# Patient Record
Sex: FEMALE | Race: WHITE | NOT HISPANIC OR LATINO | Employment: FULL TIME | URBAN - METROPOLITAN AREA
[De-identification: names, ages, dates, MRNs, and addresses within clinical notes are randomized per-mention and may not be internally consistent; named-entity substitution may affect disease eponyms.]

---
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
## 2022-02-11 VITALS
HEIGHT: 60 IN | TEMPERATURE: 99.3 F | OXYGEN SATURATION: 99 % | SYSTOLIC BLOOD PRESSURE: 108 MMHG | BODY MASS INDEX: 20.81 KG/M2 | WEIGHT: 106 LBS | WEIGHT: 112 LBS | HEIGHT: 60 IN | DIASTOLIC BLOOD PRESSURE: 70 MMHG | RESPIRATION RATE: 16 BRPM | BODY MASS INDEX: 21.99 KG/M2 | TEMPERATURE: 98 F | HEART RATE: 84 BPM

## 2022-02-11 VITALS
HEIGHT: 60 IN | DIASTOLIC BLOOD PRESSURE: 66 MMHG | SYSTOLIC BLOOD PRESSURE: 104 MMHG | BODY MASS INDEX: 21.17 KG/M2 | HEART RATE: 68 BPM | WEIGHT: 107.8 LBS

## 2022-02-11 VITALS
HEIGHT: 60 IN | SYSTOLIC BLOOD PRESSURE: 110 MMHG | BODY MASS INDEX: 21.52 KG/M2 | WEIGHT: 109.6 LBS | HEART RATE: 78 BPM | HEART RATE: 80 BPM | BODY MASS INDEX: 21.4 KG/M2 | HEIGHT: 60 IN | DIASTOLIC BLOOD PRESSURE: 60 MMHG | DIASTOLIC BLOOD PRESSURE: 62 MMHG | SYSTOLIC BLOOD PRESSURE: 102 MMHG | TEMPERATURE: 98.2 F

## 2022-02-16 NOTE — PROGRESS NOTES
Patient:   LUKE COSTELLO            MRN: [id number]            FIN: [id number]               Age:   16 years     Sex:  Female     :  2000   Associated Diagnoses:   Depression, Major; Migraine; Asthma   Author:   Ross GREEN, Chato      Impression and Plan   Diagnosis     Depression, Major (WJS42-SK F32.2).     Course:  Well controlled.    Orders     Orders   Charges (Evaluation and Management):  [id number] office outpatient visit 15 minutes (Charge) (Order): Quantity: 1, Depression, Major  Migraine.     Orders (Selected)   Prescriptions  Prescribed  Lexapro 5 mg oral tablet: 1 tab(s) ( 5 mg ), po, daily, # 30 tab(s), 5 Refill(s), Type: Maintenance, Pharmacy: [facility], 1 tab(s) po daily.     Diagnosis     Migraine (CYN00-XB G43.709).     Course:  Progressing as expected.    Orders     Orders (Selected)   Prescriptions  Prescribed  SUMAtriptan 100 mg oral tablet: 1 tab(s) ( 100 mg ), PO, Once, PRN: for migraine headache, # 9 tab(s), 5 Refill(s), Type: Soft Stop, Pharmacy: [facility], 1 tab(s) po once,PRN:for migraine headache.     Diagnosis     Asthma (BHH58-FN J45.909).     Course:  Progressing as expected.    Orders     Orders (Selected)   Prescriptions  Prescribed  ProAir HFA 90 mcg/inh inhalation aerosol: 2 puff(s) ( 180 mcg ), inh, qid, Instructions: or as directed 15 minutes before exercise, # 2 EA, 3 Refill(s), Type: Maintenance, Pharmacy: [facility], 2 puff(s) inh qid,Instr:or as directed 15 minutes before exercise  albuterol 2.5 mg/3 mL (0.083%) inhalation solution: 3 mL ( 2.5 mg ), INH, q4 hrs, PRN: for wheezing, # 60 EA, 2 Refill(s), Type: Maintenance, Pharmacy: [facility], 3 mL inh q4 hrs,PRN:for wheezing.        Visit Information      Date of Service: 2017 03:37 pm  Performing Location: [facility]  Encounter#: [id number]      Primary Care Provider (PCP):  Shannon Reyes# [id number]   Visit type:  Scheduled follow-up.    Accompanied  by:  Family member.    Source of history:  Self, Family member.    Referral source:  Self.    History limitation:  None.       Chief Complaint   1/17/2017 3:39 PM CST    Pt here for med ck        Interval History   Since the patient's last visit for a migraine:   The patient reports the migraine episodes remain unchanged.  The patient has experienced a decrease in activity level, ability to perform household functions, sleep and ability to perform work/ school duties.  Associated symptom(s) consist of sensitivity to light and nausea.  Depression   Side effects of medication unchanged.  The course is progressing as expected.  Compliance problems: none.  The effect on daily activities is no change in activity level, no change in eating habits and no change in sleeping patterns.  Medical encounters: none.  Associated symptoms characterized by no insomnia, weight gain, weight loss, loss of appetite or suicidal thoughts.        Review of Systems   Constitutional:  Negative.    Eye:  Negative.    Ear/Nose/Mouth/Throat:  Negative.    Respiratory:  Negative.    Cardiovascular:  Negative.    Gastrointestinal:  Negative.    Genitourinary:  Negative.    Hematology/Lymphatics:  Negative.    Endocrine:  Negative.    Immunologic:  Negative.    Musculoskeletal:  Negative.    Integumentary:  Negative.    Neurologic:  Negative.    Psychiatric:  Negative.    All other systems reviewed and negative      Health Status   Allergies:    Allergic Reactions (Selected)  Severity Not Documented  Flagyl (Rash)   Medications:  (Selected)   Prescriptions  Prescribed  Lexapro 5 mg oral tablet: 1 tab(s) ( 5 mg ), po, daily, # 30 tab(s), 5 Refill(s), Type: Maintenance, Pharmacy: [facility], 1 tab(s) po daily  ProAir HFA 90 mcg/inh inhalation aerosol: 2 puff(s) ( 180 mcg ), inh, qid, Instructions: or as directed 15 minutes before exercise, # 2 EA, 3 Refill(s), Type: Maintenance, Pharmacy: [facility], 2 puff(s) inh qid,Instr:or as  directed 15 minutes before exercise  SUMAtriptan 100 mg oral tablet: 1 tab(s) ( 100 mg ), PO, Once, PRN: for migraine headache, # 9 tab(s), 5 Refill(s), Type: Soft Stop, Pharmacy: [facility], 1 tab(s) po once,PRN:for migraine headache  albuterol 2.5 mg/3 mL (0.083%) inhalation solution: 3 mL ( 2.5 mg ), INH, q4 hrs, PRN: for wheezing, # 60 EA, 2 Refill(s), Type: Maintenance, Pharmacy: [facility], 3 mL inh q4 hrs,PRN:for wheezing  Documented Medications  Documented  Su 13.5 mg intrauterine device: 1 EA ( 13.5 mg ), intrauteral, once, Instructions: placed 11/17/16 due for removal by 11/17/19, 0 Refill(s), Type: Maintenance   Problem list:    All Problems (Selected)  Anxiety, generalized / SNOMED CT 09374081 / Confirmed  Asthma / SNOMED CT 449892159 / Confirmed  Depression, Major / SNOMED CT 711264203 / Confirmed  Migraine / SNOMED CT 43221860 / Confirmed      Histories   Past Medical History:    Resolved  *Hospitalized@[facility] - Gastric ulcer, mild appendicitis: Onset on 3/7/2016 at 15 years.  Resolved on 3/9/2016 at 15 years.  GERD without Esophagitis (530.81): Onset on 9/21/2012 at 11 years.  Resolved.  Asthma, exercise induced (493.81):  Resolved.   Family History:    Hypertension  Grandmother (M)  Arthritis  Mother (Lana Abrams)  Grandmother (M)  Grandmother (P)  Grandfather (M)  Grandfather (P)  Great Grandfather (M)  Great Grandmother (M)  Great Grandfather (P)  Great Grandmother (P)  Migraine  Mother (Lana Jose Alberto)  Grandmother (M)  Grandmother (P)  Brother (Feliciano Sykes)  Cancer of colon  Great Grandmother (M)  Depression  Grandmother (M)  Brother (Feliciano Sykes)  Sleep apnea  Father (Colin Sykes)  MI - Myocardial infarction  Great Grandfather (M)  Environmental allergy  Mother (Lana Jose Alberto)  Grandmother (M)     Procedure history:    Insertion of IUD (SNOMED CT 547843893) performed by Shannon Reyes on 11/17/2016 at 16 Years.  Comments:  11/22/2016 8:25 AM - Henny Hirsch CMA  insertion  consent form signed with [facility]    Lot: [id number]  Exp: 5/2017    due for removal on or by 11/17/19  Appendectomy (SNOMED CT 434201186) in the month of 3/2016 at 15 Years.  Comments:  3/15/2016 3:39 PM - Ross GREEN, [facility]  Esophagogastroduodenoscopy (SNOMED CT 932219081) in the month of 3/2016 at 15 Years.  Tonsillectomy and adenoidectomy (SNOMED CT 478871594).  Tympanostomy (SNOMED CT 5784254160).   Social History:        Tobacco Assessment: Denies Tobacco Use      Home and Environment Assessment            Lives with Mother, Stepfather.  Smoker in household: Yes.        Physical Examination   Vital Signs   1/17/2017 3:39 PM CST Peripheral Pulse Rate 68 bpm    Pulse Site Radial artery    HR Method Manual    Systolic Blood Pressure 104 mmHg    Diastolic Blood Pressure 66 mmHg    Mean Arterial Pressure 79 mmHg    BP Site Right arm    BP Method Manual      Measurements from flowsheet : Measurements   1/17/2017 3:39 PM CST Height Measured - Standard 60 in    Weight Measured - Standard 107.8 lb    BSA 1.44 m2    Body Mass Index 21.05 kg/m2    Body Mass Index Percentile 46.40      General:  No acute distress.    Neck:  Supple, No lymphadenopathy, No thyromegaly.    Respiratory:  Lungs are clear to auscultation, Respirations are non-labored, Breath sounds are equal, Symmetrical chest wall expansion.    Cardiovascular:  Normal rate, Regular rhythm, No murmur, No gallop, Good pulses equal in all extremities, Normal peripheral perfusion, No edema.    Gastrointestinal:  Soft, Non-tender, Non-distended, Normal bowel sounds, No organomegaly.    Integumentary:  Warm, Dry, Pink.    Neurologic:  Alert, Oriented.    Psychiatric:  Cooperative, Appropriate mood & affect.

## 2022-02-16 NOTE — PROGRESS NOTES
Patient:   RAMONA COSTELLO            MRN: [id number]            FIN: [id number]               Age:   16 years     Sex:  Female     :  2000   Associated Diagnoses:   Anxiety, generalized   Author:   Shannon Reyes      Visit Information      Date of Service: 2017 08:34 am  Performing Location: [facility]  Encounter#: [id number]      Primary Care Provider (PCP):  Shannon Reyes    NPI# [id number]      Referring Provider:  Shannon Reyes NPI# [id number]      Chief Complaint   2017 10:19 AM CDT    Pt. here for anxiety meds        History of Present Illness   Confirmed symptoms and concerns with patient as presented in CC above.  Here wtih mom, states she needs refill of Lexapro but needs it at the 10mg dose. Was seen in  in [place] and saw counselor to learn coping mechanisms and very effective, dose was increased to 10mg lexapro then.  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  STates she feels anxiety symptoms only (See JUAN score today) but denies any depression , that has resolved      Review of Systems            Health Status   Allergies:    Allergic Reactions (Selected)  Severity Not Documented  Flagyl (Rash)   Medications:  (Selected)   Prescriptions  Prescribed  Lexapro 10 mg oral tablet: 1 tab(s) ( 10 mg ), PO, Daily, # 90 tab(s), 3 Refill(s), Type: Maintenance, Pharmacy: [facility], 1 tab(s) po daily  Lexapro 5 mg oral tablet: 1 tab(s) ( 5 mg ), po, daily, # 30 tab(s), 5 Refill(s), Type: Maintenance, Pharmacy: [facility], 1 tab(s) po daily  SUMAtriptan 100 mg oral tablet: 1 tab(s) ( 100 mg ), PO, Once, PRN: for migraine headache, # 9 tab(s), 5 Refill(s), Type: Soft Stop, Pharmacy: [facility], 1 tab(s) po once,PRN:for migraine headache  albuterol  2.5 mg/3 mL (0.083%) inhalation solution: 3 mL ( 2.5 mg ), INH, q4 hrs, PRN: for wheezing, # 60 EA, 2 Refill(s), Type: Maintenance, Pharmacy: [facility], 3 mL inh q4 hrs,PRN:for wheezing  Documented Medications  Documented  Su 13.5 mg intrauterine device: 1 EA ( 13.5 mg ), intrauteral, once, Instructions: placed 11/17/16 due for removal by 11/17/19, 0 Refill(s), Type: Maintenance   Problem list:    All Problems  Asthma / SNOMED CT 9042076529 / Confirmed  Migraine / SNOMED CT 3312185756 / Confirmed  Anxiety, generalized / SNOMED CT 61568579 / Confirmed  Depression, Major / SNOMED CT 251399708 / Confirmed  Resolved: *Hospitalized@[facility] - Gastric ulcer, mild appendicitis  Resolved: Asthma, exercise induced / ICD-9-.81  Resolved: GERD without Esophagitis / ICD-9-.81      Histories   Past Medical History:    Resolved  *Hospitalized@[facility] - Gastric ulcer, mild appendicitis: Onset on 3/7/2016 at 15 years.  Resolved on 3/9/2016 at 15 years.  GERD without Esophagitis (530.81): Onset on 9/21/2012 at 11 years.  Resolved.  Asthma, exercise induced (493.81):  Resolved.   Family History:    Hypertension  Grandmother (M)  Arthritis  Mother (Lana Abrams)  Grandmother (M)  Grandmother (P)  Grandfather (M)  Grandfather (P)  Great Grandfather (M)  Great Grandmother (M)  Great Grandfather (P)  Great Grandmother (P)  Migraine  Mother (Lana Abrams)  Grandmother (M)  Grandmother (P)  Brother (Feliciano Sykes)  Cancer of colon  Great Grandmother (M)  Depression  Grandmother (M)  Brother (Feliciano Sykes)  Sleep apnea  Father (Colin Sykes)  MI - Myocardial infarction  Great Grandfather (M)  Environmental allergy  Mother (Lana Abrams)  Grandmother (M)     Procedure history:    Insertion of IUD (SNOMED CT 305060042) performed by Shannon Reyes on 11/17/2016 at 16 Years.  Comments:  11/22/2016 8:25 AM - Henny Hirsch CMA insertion  consent form signed with [facility]    Lot: [id number]  Exp: 5/2017    due for removal  on or by 11/17/19  Appendectomy (SNOMED CT 130278229) in the month of 3/2016 at 15 Years.  Comments:  3/15/2016 3:39 PM - Ross GREEN, [facility]  Esophagogastroduodenoscopy (SNOMED CT 660277521) in the month of 3/2016 at 15 Years.  Tonsillectomy and adenoidectomy (SNOMED CT 055912096).  Tympanostomy (SNOMED CT 5257233713).   Social History:        Tobacco Assessment: Denies Tobacco Use      Home and Environment Assessment            Lives with Mother, Stepfather.  Smoker in household: Yes.        Physical Examination   Vital Signs   8/8/2017 10:19 AM CDT Peripheral Pulse Rate 80 bpm    Pulse Site Radial artery    HR Method Manual    Systolic Blood Pressure 110 mmHg    Diastolic Blood Pressure 62 mmHg    Mean Arterial Pressure 78 mmHg    BP Site Left arm    BP Method Manual      Measurements from flowsheet : Measurements   8/8/2017 10:19 AM CDT    Height Measured - Standard                60 in     General:  Alert and oriented, No acute distress.    Integumentary:  Warm, Dry, Pink.    Neurologic:  Alert, Oriented.    Psychiatric:  Cooperative, Appropriate mood & affect, Normal judgment, Non-suicidal.       Impression and Plan   Diagnosis     Anxiety, generalized (FHP35-XZ F41.1).     Patient Instructions:       Counseled: Patient, Regarding diagnosis, Regarding treatment, Regarding medications, Verbalized understanding, Counseled on symptomatic management. Return to clinic for re evaluation if worsening, simply not improving, or failure to resolve.   .    Orders     Orders (Selected)   Prescriptions  Prescribed  Lexapro 10 mg oral tablet: 1 tab(s) ( 10 mg ), PO, Daily, # 90 tab(s), 3 Refill(s), Type: Maintenance, Pharmacy: [facility], 1 tab(s) po daily.     menatctra and hep A today.

## 2022-02-16 NOTE — PROGRESS NOTES
Patient:   LUKE COSTELLO            MRN: [id number]            FIN: [id number]               Age:   16 years     Sex:  Female     :  2000   Associated Diagnoses:   Acute viral pharyngitis   Author:   Yuriy Sena PA-C      Visit Information   Visit type:  New symptom.    Accompanied by:  No one.    Source of history:  Self, Medical record.    History limitation:  None.       Chief Complaint   2017 9:16 AM CDT     Pt here for strep sx      History of Present Illness             The patient presents with a sore throat.  The sore throat is described as tight.  The severity of the sore throat is moderate.  The timing/course of the sore throat is constant.  The sore throat has lasted for 2 day(s).  Associated symptoms consist of fever and headache.  CC above noted and confirmed with the patient..        Review of Systems   Constitutional:  Fever.    Eye:  Negative.    Ear/Nose/Mouth/Throat:  Sore throat.    Respiratory:  Negative.    Gastrointestinal:  Negative.    Neurologic:  Headache.       Health Status   Allergies:    Allergic Reactions (Selected)  Severity Not Documented  Flagyl (Rash)   Problem list:    All Problems  Anxiety, generalized / SNOMED CT 32973745 / Confirmed  Asthma / SNOMED CT 9328610022 / Confirmed  Migraine / SNOMED CT 1803487041 / Confirmed  Depression, Major / SNOMED CT 408301171 / Confirmed  Resolved: *Hospitalized@[facility] - Gastric ulcer, mild appendicitis  Resolved: Asthma, exercise induced / ICD-9-.81  Resolved: GERD without Esophagitis / ICD-9-.81   Medications:  (Selected)   Prescriptions  Prescribed  Lexapro 5 mg oral tablet: 1 tab(s) ( 5 mg ), po, daily, # 30 tab(s), 5 Refill(s), Type: Maintenance, Pharmacy: [facility], 1 tab(s) po daily  ProAir HFA 90 mcg/inh inhalation aerosol: 2 puff(s) ( 180 mcg ), inh, qid, Instructions: or as directed 15 minutes before exercise, # 2 EA, 3 Refill(s), Type: Maintenance, Pharmacy: [facility], 2 puff(s) inh  qid,Instr:or as directed 15 minutes before exercise  SUMAtriptan 100 mg oral tablet: 1 tab(s) ( 100 mg ), PO, Once, PRN: for migraine headache, # 9 tab(s), 5 Refill(s), Type: Soft Stop, Pharmacy: [facility], 1 tab(s) po once,PRN:for migraine headache  albuterol 2.5 mg/3 mL (0.083%) inhalation solution: 3 mL ( 2.5 mg ), INH, q4 hrs, PRN: for wheezing, # 60 EA, 2 Refill(s), Type: Maintenance, Pharmacy: [facility], 3 mL inh q4 hrs,PRN:for wheezing  Documented Medications  Documented  Su 13.5 mg intrauterine device: 1 EA ( 13.5 mg ), intrauteral, once, Instructions: placed 11/17/16 due for removal by 11/17/19, 0 Refill(s), Type: Maintenance      Histories   Past Medical History:    Resolved  *Hospitalized@[facility] - Gastric ulcer, mild appendicitis: Onset on 3/7/2016 at 15 years.  Resolved on 3/9/2016 at 15 years.  GERD without Esophagitis (530.81): Onset on 9/21/2012 at 11 years.  Resolved.  Asthma, exercise induced (493.81):  Resolved.   Family History:    Hypertension  Grandmother (M)  Arthritis  Mother (Lana Abrams)  Grandmother (M)  Grandmother (P)  Grandfather (M)  Grandfather (P)  Great Grandfather (M)  Great Grandmother (M)  Great Grandfather (P)  Great Grandmother (P)  Migraine  Mother (Lana Abrams)  Grandmother (M)  Grandmother (P)  Brother (Feliciano Sykes)  Cancer of colon  Great Grandmother (M)  Depression  Grandmother (M)  Brother (Feliciano Sykes)  Sleep apnea  Father (Colin Sykes)  MI - Myocardial infarction  Great Grandfather (M)  Environmental allergy  Mother (Lana Abrams)  Grandmother (M)     Procedure history:    Insertion of IUD ([id number]) on 11/17/2016 at 16 Years.  Comments:  11/22/2016 8:25 AM - Henny Hirsch CMA insertion  consent form signed with [facility]    Lot: [id number]  Exp: 5/2017    due for removal on or by 11/17/19  Appendectomy ([id number]) in the month of 3/2016 at 15 Years.  Comments:  3/15/2016 3:39 PM - Ross GREEN, [place] [facility]  [facility]  Esophagogastroduodenoscopy (495267596) in the month of 3/2016 at 15 Years.  Tonsillectomy and adenoidectomy (775955202).  Tympanostomy (4277425863).   Social History:        Tobacco Assessment: Denies Tobacco Use      Home and Environment Assessment            Lives with Mother, Stepfather.  Smoker in household: Yes.        Physical Examination   Vital Signs   6/1/2017 9:16 AM CDT     Temperature Tympanic      99.3 DegF     Measurements from flowsheet : Measurements   6/1/2017 9:16 AM CDT Height Measured - Standard 60 in    Weight Measured - Standard 106 lb    BSA 1.43 m2    Body Mass Index 20.7 kg/m2    Body Mass Index Percentile 40.11      General:  Alert and oriented, No acute distress.    Eye:  Pupils are equal, round and reactive to light, Extraocular movements are intact, Normal conjunctiva.    HENT:  Normocephalic, Tympanic membranes are clear, Oral mucosa is moist.         Throat: Tonsils ( Absent ), Pharynx ( Erythematous ).    Neck:  Supple.         Lymph nodes: Tonsillar.    Respiratory:  Lungs are clear to auscultation, Respirations are non-labored, Breath sounds are equal.    Cardiovascular:  Normal rate, Regular rhythm, No murmur.    Integumentary:  Warm, Moist, No rash.       Review / Management   Results review:  Strep test is negative.       Impression and Plan   Diagnosis     Acute viral pharyngitis (ZVH75-DR J02.8).     Supportive therapy.  Recheck as we have discussed.  Culture pending.

## 2022-02-16 NOTE — PROGRESS NOTES
Patient:   LUKE COSTELLO            MRN: [id number]            FIN: [id number]               Age:   16 years     Sex:  Female     :  2000   Associated Diagnoses:   Impacted cerumen of both ears; Left otitis externa   Author:   Shannon Reyes      Visit Information      Date of Service: 2017 03:38 pm  Performing Location: [facility]  Encounter#: [id number]      Primary Care Provider (PCP):  Shannon Reyes    NPI# [id number]      Referring Provider:  No referring provider recorded for selected visit.      Chief Complaint   3/24/2017 4:01 PM CDT    Pt presents today to address her left ear pain and drainage that started a few days ago. Her right ear has been plugged the past few weeks. The past week it has been draining. H/o ear infections. She had about 12 tubes total. Ear canal shapped different.        History of Present Illness   Confirmed symptoms and concerns with patient as presented in CC above. Last set of tubes in ears age 11 year.  Drainage right ear, pain left ear 2 days.  NO martín used      Review of Systems   Constitutional:  No fever, No chills.    Ear/Nose/Mouth/Throat:  No ear pain, No nasal congestion, No sore throat.    Respiratory:  No shortness of breath, No cough, No wheezing.    Gastrointestinal:  No nausea, No vomiting, No diarrhea.             Health Status   Allergies:    Allergic Reactions (Selected)  Severity Not Documented  Flagyl (Rash)   Medications:  (Selected)   Prescriptions  Prescribed  Lexapro 5 mg oral tablet: 1 tab(s) ( 5 mg ), po, daily, # 30 tab(s), 5 Refill(s), Type: Maintenance, Pharmacy: [facility], 1 tab(s) po daily  ProAir HFA 90 mcg/inh inhalation aerosol: 2 puff(s) ( 180 mcg ), inh, qid, Instructions: or as directed 15 minutes before exercise, # 2 EA, 3 Refill(s), Type: Maintenance, Pharmacy: [facility], 2 puff(s) inh qid,Instr:or as directed 15 minutes before exercise  SUMAtriptan 100 mg oral tablet: 1 tab(s) ( 100 mg  ), PO, Once, PRN: for migraine headache, # 9 tab(s), 5 Refill(s), Type: Soft Stop, Pharmacy: [facility], 1 tab(s) po once,PRN:for migraine headache  albuterol 2.5 mg/3 mL (0.083%) inhalation solution: 3 mL ( 2.5 mg ), INH, q4 hrs, PRN: for wheezing, # 60 EA, 2 Refill(s), Type: Maintenance, Pharmacy: [facility], 3 mL inh q4 hrs,PRN:for wheezing  Documented Medications  Documented  Su 13.5 mg intrauterine device: 1 EA ( 13.5 mg ), intrauteral, once, Instructions: placed 11/17/16 due for removal by 11/17/19, 0 Refill(s), Type: Maintenance   Problem list:    All Problems  Asthma / SNOMED CT 5885662767 / Confirmed  Migraine / SNOMED CT 1919047246 / Confirmed  Anxiety, generalized / SNOMED CT 79260335 / Confirmed  Depression, Major / SNOMED CT 628900541 / Confirmed  Resolved: *Hospitalized@[facility] - Gastric ulcer, mild appendicitis  Resolved: Asthma, exercise induced / ICD-9-.81  Resolved: GERD without Esophagitis / ICD-9-.81      Histories   Past Medical History:    Resolved  *Hospitalized@[facility] - Gastric ulcer, mild appendicitis: Onset on 3/7/2016 at 15 years.  Resolved on 3/9/2016 at 15 years.  GERD without Esophagitis (530.81): Onset on 9/21/2012 at 11 years.  Resolved.  Asthma, exercise induced (493.81):  Resolved.   Family History:    Hypertension  Grandmother (M)  Arthritis  Mother (Lana Abrams)  Grandmother (M)  Grandmother (P)  Grandfather (M)  Grandfather (P)  Great Grandfather (M)  Great Grandmother (M)  Great Grandfather (P)  Great Grandmother (P)  Migraine  Mother (Lana Abrams)  Grandmother (M)  Grandmother (P)  Brother (Feliciano Smithelton)  Cancer of colon  Great Grandmother (M)  Depression  Grandmother (M)  Brother (Feliciano Engelshamar)  Sleep apnea  Father (Colin Sykes)  MI - Myocardial infarction  Great Grandfather (M)  Environmental allergy  Mother (Lana Abrams)  Grandmother (M)     Procedure history:    Insertion of IUD (SNOMED CT 501027541) performed by Shannon Reyes on  11/17/2016 at 16 Years.  Comments:  11/22/2016 8:25 AM - Torrey GANDHI, Henny Blue insertion  consent form signed with NCB    Lot: [id number]  Exp: 5/2017    due for removal on or by 11/17/19  Appendectomy (SNOMED CT 348781509) in the month of 3/2016 at 15 Years.  Comments:  3/15/2016 3:39 PM - Ross GRENE, [facility]  Esophagogastroduodenoscopy (SNOMED CT 759378989) in the month of 3/2016 at 15 Years.  Tonsillectomy and adenoidectomy (SNOMED CT 169003078).  Tympanostomy (SNOMED CT 0874775374).   Social History:        Tobacco Assessment: Denies Tobacco Use      Home and Environment Assessment            Lives with Mother, Stepfather.  Smoker in household: Yes.        Physical Examination   Vital Signs   3/24/2017 4:01 PM CDT Temperature Tympanic 98.0 DegF    Peripheral Pulse Rate 84 bpm    HR Method Electronic    Respiratory Rate 16 br/min    Systolic Blood Pressure 108 mmHg    Diastolic Blood Pressure 70 mmHg    Mean Arterial Pressure 83 mmHg    BP Site Right arm    BP Method Manual    Oxygen Saturation 99 %      Measurements from flowsheet : Measurements   3/24/2017 4:01 PM CDT Height Measured - Standard 60 in    Weight Measured - Standard 112 lb    BSA 1.47 m2    Body Mass Index 21.87 kg/m2    Body Mass Index Percentile 55.97      General:  Alert and oriented, No acute distress.    Eye:  Normal conjunctiva.    HENT:  Normal hearing, Oral mucosa is moist, No pharyngeal erythema, No sinus tenderness, both tms with ceruman.    Neck:  Supple, Non-tender, No lymphadenopathy.    Respiratory:  Lungs are clear to auscultation, Respirations are non-labored, Breath sounds are equal, Symmetrical chest wall expansion.    Cardiovascular:  Normal rate, Regular rhythm, No murmur.    Musculoskeletal:  Normal range of motion, Normal gait.    Integumentary:  Warm, Dry, Pink, No rash.    Neurologic:  Alert, Oriented.    Psychiatric:  Cooperative.       Review / Management   Course:  bot ear irriggated with warm water and  return of ceruman plug, currette used to remove some wax. Re peat exam with left TM clear, right tm clear but canal red.       Impression and Plan   Diagnosis     Impacted cerumen of both ears (PFB54-NG H61.23).     Left otitis externa (ULG94-TZ H60.92).     Patient Instructions:       Counseled: Patient, Regarding diagnosis, Regarding treatment, Regarding medications, Verbalized understanding, Counseled on symptomatic management. Return to clinic for re evaluation if worsening, simply not improving, or failure to resolve.   .    Orders     Orders (Selected)   Prescriptions  Prescribed  Cortisporin Otic suspension: 3 drop(s), Right ear, TID, # 7.5 mL, 0 Refill(s), Type: Maintenance, Pharmacy: [id number], 3 drop(s) right ear tid,x5 day(s).

## 2022-02-16 NOTE — PROGRESS NOTES
Patient:   LUKE COSTELLO            MRN: [id number]            FIN: [id number]               Age:   16 years     Sex:  Female     :  2000   Associated Diagnoses:   Hearing deficit   Author:   Shannon Reyes      Visit Information      Date of Service: 2017 04:25 pm  Performing Location: [facility]  Encounter#: [id number]      Primary Care Provider (PCP):  Shannon Reyes    NPI# [id number]      Referring Provider:  Shannon Reyes    NPI# [id number]      Chief Complaint   2017 4:33 PM CDT    c/o bilateral ears plugged x 2 weeks        History of Present Illness   Confirmed symptoms and concerns with patient as presented in CC above. Last set of tubes in ears age 11 year.    She has had approx 8-12 surgeries on ears in the past  no otc meds used  no pain, just a plugged feeling  getting over a head cold      Review of Systems   Constitutional:  No fever, No chills.    Ear/Nose/Mouth/Throat:  No nasal congestion, No sore throat.    Respiratory:  No shortness of breath, No cough, No wheezing.    Gastrointestinal:  No nausea, No vomiting, No diarrhea.              Health Status   Allergies:    Allergic Reactions (Selected)  Severity Not Documented  Flagyl (Rash)   Medications:  (Selected)   Prescriptions  Prescribed  Lexapro 5 mg oral tablet: 1 tab(s) ( 5 mg ), po, daily, # 30 tab(s), 5 Refill(s), Type: Maintenance, Pharmacy: [facility], 1 tab(s) po daily  ProAir HFA 90 mcg/inh inhalation aerosol: 2 puff(s) ( 180 mcg ), inh, qid, Instructions: or as directed 15 minutes before exercise, # 2 EA, 3 Refill(s), Type: Maintenance, Pharmacy: [facility], 2 puff(s) inh qid,Instr:or as directed 15 minutes before exercise  SUMAtriptan 100 mg oral tablet: 1 tab(s) ( 100 mg ), PO, Once, PRN: for migraine headache, # 9 tab(s), 5 Refill(s), Type: Soft Stop, Pharmacy: [facility], 1 tab(s) po once,PRN:for migraine headache  albuterol 2.5 mg/3 mL (0.083%) inhalation  solution: 3 mL ( 2.5 mg ), INH, q4 hrs, PRN: for wheezing, # 60 EA, 2 Refill(s), Type: Maintenance, Pharmacy: [facility], 3 mL inh q4 hrs,PRN:for wheezing  Documented Medications  Documented  Su 13.5 mg intrauterine device: 1 EA ( 13.5 mg ), intrauteral, once, Instructions: placed 11/17/16 due for removal by 11/17/19, 0 Refill(s), Type: Maintenance   Problem list:    All Problems  Asthma / SNOMED CT 8472451975 / Confirmed  Migraine / SNOMED CT 3116035631 / Confirmed  Anxiety, generalized / SNOMED CT 14043121 / Confirmed  Depression, Major / SNOMED CT 043452080 / Confirmed  Resolved: *Hospitalized@[facility] - Gastric ulcer, mild appendicitis  Resolved: Asthma, exercise induced / ICD-9-.81  Resolved: GERD without Esophagitis / ICD-9-.81      Histories   Past Medical History:    Resolved  *Hospitalized@[facility] - Gastric ulcer, mild appendicitis: Onset on 3/7/2016 at 15 years.  Resolved on 3/9/2016 at 15 years.  GERD without Esophagitis (530.81): Onset on 9/21/2012 at 11 years.  Resolved.  Asthma, exercise induced (493.81):  Resolved.   Family History:    Hypertension  Grandmother (M)  Arthritis  Mother (Lana Abrams)  Grandmother (M)  Grandmother (P)  Grandfather (M)  Grandfather (P)  Great Grandfather (M)  Great Grandmother (M)  Great Grandfather (P)  Great Grandmother (P)  Migraine  Mother (Lana Abrams)  Grandmother (M)  Grandmother (P)  Brother (Feliciano Sykes)  Cancer of colon  Great Grandmother (M)  Depression  Grandmother (M)  Brother (Feliciano Sykes)  Sleep apnea  Father (Colin Sykes)  MI - Myocardial infarction  Great Grandfather (M)  Environmental allergy  Mother (Lana Abrams)  Grandmother (M)     Procedure history:    Insertion of IUD (SNOMED CT 510010665) performed by Shannon Reyes on 11/17/2016 at 16 Years.  Comments:  11/22/2016 8:25 AM - Henny Hirsch CMA insertion  consent form signed with NCB    Lot: [id number]  Exp: 5/2017    due for removal on or by 11/17/19  Appendectomy  (SNOMED CT 440445541) in the month of 3/2016 at 15 Years.  Comments:  3/15/2016 3:39 PM - Ross GREEN, [facility]  Esophagogastroduodenoscopy (SNOMED CT 841154144) in the month of 3/2016 at 15 Years.  Tonsillectomy and adenoidectomy (SNOMED CT 606017747).  Tympanostomy (SNOMED CT 3018928686).   Social History:        Tobacco Assessment: Denies Tobacco Use      Home and Environment Assessment            Lives with Mother, Stepfather.  Smoker in household: Yes.        Physical Examination   Vital Signs   6/12/2017 4:33 PM CDT Temperature Tympanic 98.2 DegF    Peripheral Pulse Rate 78 bpm    Pulse Site Radial artery    HR Method Manual    Systolic Blood Pressure 102 mmHg    Diastolic Blood Pressure 60 mmHg    Mean Arterial Pressure 74 mmHg    BP Site Right arm    BP Method Manual      Measurements from flowsheet : Measurements   6/12/2017 4:33 PM CDT Height Measured - Standard 60 in    Weight Measured - Standard 109.6 lb    BSA 1.45 m2    Body Mass Index 21.4 kg/m2    Body Mass Index Percentile 49.39      General:  Alert and oriented, No acute distress.    Eye:  Normal conjunctiva.    HENT:  Oral mucosa is moist, No pharyngeal erythema, No sinus tenderness, shiney TM's bilat, no signs of infection, minimal wax.    Neck:  Supple, Non-tender, No lymphadenopathy.    Musculoskeletal:  Normal range of motion, Normal gait.    Integumentary:  Warm, Dry, Pink, No rash.    Neurologic:  Alert, Oriented.    Psychiatric:  Cooperative.       Impression and Plan   Diagnosis     Hearing deficit (LUK16-IL H91.90).     Patient Instructions:       Counseled: Patient, Regarding diagnosis, Regarding treatment, Regarding medications, Verbalized understanding, Counseled on symptomatic management. Return to clinic for re evaluation if worsening, simply not improving, or failure to resolve.   , will try otc loratadine 10mg daily for 10 days  reassured wax is not the problem.